# Patient Record
Sex: FEMALE | URBAN - METROPOLITAN AREA
[De-identification: names, ages, dates, MRNs, and addresses within clinical notes are randomized per-mention and may not be internally consistent; named-entity substitution may affect disease eponyms.]

---

## 2022-09-09 ENCOUNTER — APPOINTMENT (OUTPATIENT)
Dept: URBAN - METROPOLITAN AREA CLINIC 211 | Age: 72
Setting detail: DERMATOLOGY
End: 2022-09-25

## 2022-09-09 DIAGNOSIS — Z71.89 OTHER SPECIFIED COUNSELING: ICD-10-CM

## 2022-09-09 DIAGNOSIS — L57.0 ACTINIC KERATOSIS: ICD-10-CM

## 2022-09-09 DIAGNOSIS — L23.9 ALLERGIC CONTACT DERMATITIS, UNSPECIFIED CAUSE: ICD-10-CM

## 2022-09-09 DIAGNOSIS — L29.8 OTHER PRURITUS: ICD-10-CM

## 2022-09-09 PROCEDURE — OTHER COUNSELING: OTHER

## 2022-09-09 PROCEDURE — OTHER OTHER: OTHER

## 2022-09-09 PROCEDURE — OTHER ORDER FOR PHOTODYNAMIC THERAPY: OTHER

## 2022-09-09 PROCEDURE — OTHER PHOTODYNAMIC THERAPY COUNSELING: OTHER

## 2022-09-09 PROCEDURE — OTHER SUNSCREEN RECOMMENDATIONS: OTHER

## 2022-09-09 PROCEDURE — OTHER MIPS QUALITY: OTHER

## 2022-09-09 PROCEDURE — OTHER TREATMENT REGIMEN: OTHER

## 2022-09-09 PROCEDURE — OTHER REASSURANCE: OTHER

## 2022-09-09 PROCEDURE — 99203 OFFICE O/P NEW LOW 30 MIN: CPT

## 2022-09-09 ASSESSMENT — LOCATION DETAILED DESCRIPTION DERM
LOCATION DETAILED: LEFT INFERIOR CENTRAL MALAR CHEEK
LOCATION DETAILED: LEFT CENTRAL MALAR CHEEK
LOCATION DETAILED: RIGHT INFERIOR MEDIAL UPPER BACK

## 2022-09-09 ASSESSMENT — LOCATION ZONE DERM
LOCATION ZONE: TRUNK
LOCATION ZONE: FACE

## 2022-09-09 ASSESSMENT — LOCATION SIMPLE DESCRIPTION DERM
LOCATION SIMPLE: RIGHT UPPER BACK
LOCATION SIMPLE: LEFT CHEEK

## 2022-09-09 NOTE — PROCEDURE: OTHER
Detail Level: Zone
Note Text (......Xxx Chief Complaint.): This diagnosis correlates with the
Render Risk Assessment In Note?: no
Other (Free Text): Pre-certification submitted\\nPatient declined LN2 treatment\\n------\\nPre-cert was submitted and patient declined PDT treatment of face due to cost

## 2022-09-09 NOTE — PROCEDURE: TREATMENT REGIMEN
Plan: 3. D/C all products with fragrance\\n4. Change sunscreen to mineral based SPF- sampled options for her to try
Otc Regimen: 1. CeraVe Hydrating Cleanser\\n2. CeraVe Cream BID
Detail Level: Zone

## 2022-09-09 NOTE — PROCEDURE: MIPS QUALITY
Detail Level: Detailed
Quality 431: Preventive Care And Screening: Unhealthy Alcohol Use - Screening: Patient not identified as an unhealthy alcohol user when screened for unhealthy alcohol use using a systematic screening method
Quality 226: Preventive Care And Screening: Tobacco Use: Screening And Cessation Intervention: Patient screened for tobacco use and is an ex/non-smoker
Quality 111:Pneumonia Vaccination Status For Older Adults: Pneumococcal vaccine (PPSV23) administered on or after patient’s 60th birthday and before the end of the measurement period
Quality 130: Documentation Of Current Medications In The Medical Record: Current Medications Documented
Quality 110: Preventive Care And Screening: Influenza Immunization: Influenza Immunization previously received during influenza season

## 2022-09-09 NOTE — PROCEDURE: ORDER FOR PHOTODYNAMIC THERAPY
Addended by: Monroe Rob on: 3/11/2021 09:14 AM     Modules accepted: Orders
Chest Incubation Time: 2 Hours
Consent: The procedure and risks were reviewed with the patient including but not limited to: burning, pigmentary changes, pain, blistering, scabbing, redness, and the possibility of needing numerous treatments. Strict photoprotection after the procedure was also discussed.
Face, Ears And  Scalp Incubation Time: 1 Hour
Photosensitizer: Levulan
Face Incubation Time: none
Debridement: Yes
Face And Scalp Incubation Time: 1 Hour for the face and 2 Hours for the scalp
Pdt Type: MARCO-U
Frequency Of Pdt: Single Treatment
Location: Face
Detail Level: Simple
Occlusion: No

## 2022-09-09 NOTE — PROCEDURE: SUNSCREEN RECOMMENDATIONS
